# Patient Record
(demographics unavailable — no encounter records)

---

## 2017-10-25 NOTE — REP
MRI LUMBAR SPINE WITHOUT CONTRAST:

 

HISTORY:  Low back pain.

 

No comparison imaging.

 

TECHNIQUE:  Sagittal and axial T1 and T2-weighted scans are acquired in the usual

fashion with and without fat saturation.  Sequences include spin echo, turbo spin

echo, and STIR imaging sequences.

 

MRI FINDINGS:  Cortical and medullary bone signal intensity are normal.

Vertebral body heights are preserved.  Alignment is normal.  Incidental note is

made of a cyst in the right kidney measuring 3.2 cm involving its mid pole

region.  No other extra vertebral abnormality is seen.  There is minimal disc

space narrowing and desiccation at L4-5 and L5-S1 consistent with degenerative

disc disease at these levels.  There is a vestigial disc at S1-S2.  There is

diffuse disc bulging of the posterior margin of the 4-5 disc indenting the thecal

sac but without the thecal sac compression.  No neural foraminal narrowing is

seen.  There is mild diffuse bulging of the L5-S1 disc margin as well.  No neural

foraminal narrowing or central canal stenosis.  Conus medullaris is normal in

position and appearance at L1.

 

IMPRESSION: Mild degenerative disc changes at L4-5 and L5-S1.  Incidental note is

made of a 3.2 cm cyst in the right kidney.  No other abnormality.

 

 

Signed by

Ad Davis MD 10/25/2017 02:18 P

## 2019-11-25 NOTE — REP
Clinical:  Hematuria.

 

Technique:  Axial precontrast, contrast enhanced, and delayed images of the

abdomen and pelvis using 100 ml Isovue 370 intravenous contrast material with

multiplanar re-formations and volume rendered urogram.

 

Findings:

Evaluation of the urinary tract system demonstrates a suspected proteinaceous

complex cyst in the right kidney measuring approximately 2.5 cm maximal diameter.

No hydroureteronephrosis, perinephric stranding, intrarenal or obstructing

ureteral calculi are identified.  The collecting system as well as bladder appear

normal.

 

Liver, spleen, pancreas, gallbladder, and bilateral adrenal glands are normal.

The enteric system is without obstruction or acute inflammatory process.

Scattered sigmoid diverticula noted without acute diverticulitis.  Pelvis

demonstrates normal bladder and age-appropriate uterus/adnexa.  No ascites.  No

free air.  No adenopathy.  Abdominal aorta without aneurysm or dissection.

Musculoskeletal structures are intact.

 

Impression:

1.  A 2.5 cm complex proteinaceous cyst in the right kidney.  No further urinary

tract pathology appreciated.

 

 

Electronically Signed by

Lakhwinder Welsh MD 11/25/2019 03:00 P

## 2019-12-05 NOTE — REP
Two-view chest:  12/05/2019.

 

Indication:  Dyspnea.

 

Comparison:  None.

 

Findings:

 

There is a small left upper lobe air space consolidation. There is no pleural

effusion or pneumothorax.  The cardiomediastinal silhouette is unremarkable.

 

Impression:

 

Small left upper lobe pneumonia.

 

 

Electronically Signed by

Lei Cameron DO 12/05/2019 03:33 P

## 2020-03-02 NOTE — REP
RIGHT KNEE, FIVE VIEWS:

 

There is no evidence of an acute fracture, dislocation or intrinsic bone

disease.

 

IMPRESSION:  No fracture or dislocation.

 

 

Electronically Signed by

Roberto Carlos Hood MD 03/02/2020 01:21 P